# Patient Record
(demographics unavailable — no encounter records)

---

## 2024-11-04 NOTE — HISTORY OF PRESENT ILLNESS
[FreeTextEntry1] : Mr. FORD is a 48-year-old male who presents for initial endocrine evaluation. He presents with regard to a history of Low AM Cortisol and Testosterone.   Patient had labs done 7:21am on 10/03/2024 per functional medicine specialist, Dr. Sherron Mayer, showing: AM cortisol 22.2 Testosterone 482, Free testosterone 38.4 SHBG 60; elevated  Selenium 178; elevated  Total Estrogens 78 FSH 3.4 LH 2 Progesterone <0.5 Prolactin 9 TSH 1.71, FT4 1.3, FT3 3.5 B12 513 B6 57.3; elevated Vit D 80 A1c 5.3%  Patient reports that he did have low AM testosterone level in the 200s range in 2023 on labs per cardiologist. At that time, had considered starting TRT but was hesitant and instead opted to take supplements including Shilajit, Tongkat Ali, and Ashwagandha for the last 6 months. Has not seen improvement in sx with these supplements.   He reports fatigue, low libido and some ED.   He does note that he was advised to improve his weight status and has lost about 30lbs since 2023 (within one year) but has been struggling with muscle gain despite weightlifting. Notes that he has intermittently exercised throughout his life but has never had muscle gain.   Does have stretch marks but only with working out at the gym.   He has no history of hypokalemia, uncontrolled hypertension, hyperpigmented striae, buffalo hump or easy bruising.  He too denies any headaches, palpitations, or orthostatic lightheadedness.  He does have children and would not like to conceive again in the future.   Additional medical history includes that of Gerd, Pulmonary nodule (saw Dr. Whitmore)  PSH: none  Medications: Pantoprazole prn (takes for 2-3 months then stops) Supplements: vitamin D3 5,000 IU daily, vitamin B12, flax seed oil, zinc, magnesium, copper, L-Arginine, and others (has been taking several supplements for many years)  Allergies: none   Soc Hx: denies tobacco use, drinks alcohol a few times weekly (about 3 drinks at a time)  FHx: Mother has thyroid dysfunction and is my pt. Father passed from leukemia and did have CAD and DM late in life   Cardiologist: Dr. Hernandez

## 2024-11-04 NOTE — ADDENDUM
[FreeTextEntry1] : This note was written by Migdalia Hoang on 11/04/2024 acting as medical scribe for Dr. Christiano Gonzalez. I, Dr. Christiano Gonzalez, have read and attest that all the information, medical decision making and discharge instructions within are true and accurate.

## 2024-12-05 NOTE — PROCEDURE
[FreeTextEntry1] : CAT scan of November 12, 2023 reveals a 2 mm subpleural left lower lobe nodule.  Nodule not well-visualized on CAT scan of November 12th 2022.  May be related to cuts and technique.

## 2024-12-05 NOTE — DISCUSSION/SUMMARY
[FreeTextEntry1] : 2 mm nodule left lower lobe may be new.  Not noted on CT 1 year prior. Low risk patient.  Likely benign in origin.  Patient clinically well. Normal chest radiograph.

## 2024-12-05 NOTE — CONSULT LETTER
[Dear  ___] : Dear ~YARON, [Consult Letter:] : I had the pleasure of evaluating your patient, [unfilled]. [Consult Closing:] : Thank you very much for allowing me to participate in the care of this patient.  If you have any questions, please do not hesitate to contact me. [Sincerely,] : Sincerely, [FreeTextEntry2] : Farshad Hernandez MD [FreeTextEntry3] : [facility]

## 2024-12-05 NOTE — CONSULT LETTER
[Dear  ___] : Dear ~YARON, [Consult Letter:] : I had the pleasure of evaluating your patient, [unfilled]. [Consult Closing:] : Thank you very much for allowing me to participate in the care of this patient.  If you have any questions, please do not hesitate to contact me. [Sincerely,] : Sincerely, [FreeTextEntry2] : Farshad Hernandez MD [FreeTextEntry3] : [facility]

## 2024-12-05 NOTE — HISTORY OF PRESENT ILLNESS
[Never] : never [TextBox_4] : Feeling well No significant cough, wheezing, chest pain or SOB. No complaints.

## 2025-03-06 NOTE — ADDENDUM
[FreeTextEntry1] : This note was written by Angy Coleman on 03/06/2025 acting as medical scribe for Dr. Christiano Gonzalez. I, Dr. Christiano Gonzalez, have read and attest that all the information, medical decision making and discharge instructions within are true and accurate.

## 2025-03-06 NOTE — HISTORY OF PRESENT ILLNESS
[FreeTextEntry1] : Mr. FORD is a 49-year-old male who returns for follow up endocrine reevaluation. He returns with regard to a history of Low AM Cortisol and Testosterone.   Additional medical history includes that of GERD, Pulmonary nodule (saw Dr. Whitmore)  Labs 24 at 8:16AM Free Testosterone: 47 Total Testosterone: 540  Outside labs 25 at 1:08PM Total Testosterone: 553 Free Testosterone: 71.9 SHB PSA: 0.63  Patient reports that he did have low AM testosterone level in the 200s range in  on labs per cardiologist. At that time, had considered starting TRT but was hesitant and instead opted to take supplements including Shilajit, Tongkat Ali, and Ashwagandha in the past year . Has not seen improvement in sx with these supplements.   He reports fatigue, low libido and some ED.   He is taking Testosterone 200 mg/mL and Tadalafil 20 mg.  He does note that he was advised to improve his weight status and has lost about 30lbs since  (within one year) but has been struggling with muscle gain despite weightlifting. Notes that he has intermittently exercised throughout his life but has never had muscle gain.   Does have stretch marks but only with working out at the gym.   He reports that he has gained 10 lbs since starting Testosterone. He was not working out prior and still does not work out and his diet has not changed. Stopped working out 5 months ago. Energy is still low.  He has no history of hypokalemia, uncontrolled hypertension, hyperpigmented striae, buffalo hump or easy bruising.  He too denies any headaches, palpitations, or orthostatic lightheadedness.  He does have children and would not like to conceive again in the future.   He started getting intermittent pain in the right lower quadrant last month in February and is taking Pantoprazole. It lasts for 10 days and sometimes radiates to his navel. Denies nausea. Has not f/u with GI. ____________________________________________________________________________________________________  Medications: Pantoprazole prn (takes for 2-3 months then stops) Supplements: vitamin D3 5,000 IU daily, vitamin B12, flax seed oil, zinc, magnesium, copper, L-Arginine, and others (has been taking several supplements for many years)  Soc Hx: denies tobacco use, drinks alcohol a few times weekly (about 3 drinks at a time)  FHx: Mother has thyroid dysfunction and is my pt. Father passed from leukemia and did have CAD and DM late in life   Cardiologist: Dr. Hernandez